# Patient Record
Sex: MALE
[De-identification: names, ages, dates, MRNs, and addresses within clinical notes are randomized per-mention and may not be internally consistent; named-entity substitution may affect disease eponyms.]

---

## 2023-06-07 PROBLEM — Z00.00 ENCOUNTER FOR PREVENTIVE HEALTH EXAMINATION: Status: ACTIVE | Noted: 2023-06-07

## 2023-07-07 ENCOUNTER — APPOINTMENT (OUTPATIENT)
Dept: DERMATOLOGY | Facility: CLINIC | Age: 58
End: 2023-07-07
Payer: COMMERCIAL

## 2023-07-07 VITALS — WEIGHT: 225 LBS | BODY MASS INDEX: 30.48 KG/M2 | HEIGHT: 72 IN

## 2023-07-07 DIAGNOSIS — D48.5 NEOPLASM OF UNCERTAIN BEHAVIOR OF SKIN: ICD-10-CM

## 2023-07-07 DIAGNOSIS — D22.9 MELANOCYTIC NEVI, UNSPECIFIED: ICD-10-CM

## 2023-07-07 PROCEDURE — 11300 SHAVE SKIN LESION 0.5 CM/<: CPT

## 2023-07-07 PROCEDURE — 99203 OFFICE O/P NEW LOW 30 MIN: CPT | Mod: 25

## 2023-07-07 RX ORDER — MUPIROCIN 20 MG/G
2 OINTMENT TOPICAL
Qty: 1 | Refills: 0 | Status: ACTIVE | COMMUNITY
Start: 2023-07-07 | End: 1900-01-01

## 2023-07-14 ENCOUNTER — NON-APPOINTMENT (OUTPATIENT)
Age: 58
End: 2023-07-14

## 2023-07-14 LAB — DERMATOLOGY BIOPSY: NORMAL

## 2024-05-08 ENCOUNTER — APPOINTMENT (OUTPATIENT)
Dept: DERMATOLOGY | Facility: CLINIC | Age: 59
End: 2024-05-08
Payer: COMMERCIAL

## 2024-05-08 DIAGNOSIS — R22.9 LOCALIZED SWELLING, MASS AND LUMP, UNSPECIFIED: ICD-10-CM

## 2024-05-08 DIAGNOSIS — L82.1 OTHER SEBORRHEIC KERATOSIS: ICD-10-CM

## 2024-05-08 DIAGNOSIS — R52 PAIN, UNSPECIFIED: ICD-10-CM

## 2024-05-08 DIAGNOSIS — L81.0 POSTINFLAMMATORY HYPERPIGMENTATION: ICD-10-CM

## 2024-05-08 DIAGNOSIS — L73.1 PSEUDOFOLLICULITIS BARBAE: ICD-10-CM

## 2024-05-08 PROCEDURE — 99214 OFFICE O/P EST MOD 30 MIN: CPT

## 2024-05-08 RX ORDER — CLINDAMYCIN PHOSPHATE AND BENZOYL PEROXIDE 10; 50 MG/G; MG/G
1.2-5 GEL TOPICAL TWICE DAILY
Qty: 1 | Refills: 0 | Status: ACTIVE | COMMUNITY
Start: 2024-05-08 | End: 1900-01-01

## 2024-05-08 NOTE — ASSESSMENT
[FreeTextEntry1] : #Pain with an overlying #hyperpigmented patch on the R upper arm  New diagnosis with uncertain prognosis  - Suspect resolved inflammatory lesion with PIH, as well as underlying MSK issue  - F/u with primary care for further imaging and workup   #Ingrown hair, posterior neck - new diagnosis with uncertain prognosis  - Diagnosis, chronic nature, disease course, treatment options and goals of therapy discussed - START clinda-BPO 1.2-5% gel to AA BID as needed  - May inject with ILK if becomes inflamed; pt defers today  #Seborrheic Keratosis - These growths are benign - Related to genetics - these lesions run in families; NOT related to sun exposure - No treatment warranted unless inflamed   RTC October for FBSE

## 2024-05-08 NOTE — HISTORY OF PRESENT ILLNESS
[FreeTextEntry1] : spots [de-identified] : WHITNEY MCKEE is a 58 year old M who present for f/u as below.   #Bump on right upper arm since February. Painful. Taking an anti-inflammatory medication (unsure of name) without much relief. Denying shoulder or neck pain.  #Scaly bump on L back #Bump on back of neck, irritated by a chain for years. Painful and drains with flares.    Derm hx: FEP on R flank Personal hx of skin cancer: no  FHx of skin cancer: no  Social hx:  for electrical company

## 2024-05-08 NOTE — PHYSICAL EXAM
[Alert] : alert [Oriented x 3] : ~L oriented x 3 [Well Nourished] : well nourished [Conjunctiva Non-injected] : conjunctiva non-injected [No Visual Lymphadenopathy] : no visual  lymphadenopathy [No Clubbing] : no clubbing [No Edema] : no edema [No Bromhidrosis] : no bromhidrosis [No Chromhidrosis] : no chromhidrosis [Declined] : declined [FreeTextEntry3] : stuck-on brown plaque on the left lower back   light pink patch with overlying pustule on the occipital scalp  faint hyperpigmented patch on the right upper arm; no palpable SQ lesion

## 2024-05-09 ENCOUNTER — APPOINTMENT (OUTPATIENT)
Dept: ORTHOPEDIC SURGERY | Facility: CLINIC | Age: 59
End: 2024-05-09
Payer: COMMERCIAL

## 2024-05-09 VITALS
WEIGHT: 212 LBS | SYSTOLIC BLOOD PRESSURE: 108 MMHG | DIASTOLIC BLOOD PRESSURE: 75 MMHG | HEIGHT: 72 IN | TEMPERATURE: 97.6 F | HEART RATE: 82 BPM | BODY MASS INDEX: 28.71 KG/M2

## 2024-05-09 DIAGNOSIS — M75.81 OTHER SHOULDER LESIONS, RIGHT SHOULDER: ICD-10-CM

## 2024-05-09 PROCEDURE — 99204 OFFICE O/P NEW MOD 45 MIN: CPT

## 2024-05-09 NOTE — PHYSICAL EXAM
[de-identified] : Constitutional: Well-nourished, well-developed, No acute distress Respiratory:  Good respiratory effort, no SOB Psychiatric: Pleasant and normal affect, alert and oriented x3 Skin: Clean dry and intact B/L UE Musculoskeletal: normal except where as noted in regional exam   Right Shoulder: APPEARANCE: no marked deformities, no swelling or malalignment POSITIVE TENDERNESS: supraspinatus NONTENDER:  long head biceps tendon, infraspinatus, teres minor. biceps. anterior and posterior capsule. AC joint. ROM: full with mild painful arc past 60 degrees, no scapular winging or dyskinesia present RESISTIVE TESTING: MMT 4+/5 ER, Flexion and Empty can, 5/5 IR. painless 5/5 resisted ext, horizontal abd/add SPECIAL TESTS: + Horn and Neers, mildly + cross arm adduction, + Speeds, neg Sams's, neg Drop Arm, neg Apprehension. neg apley's scratch test    [de-identified] :  The following radiographs were ordered and read by me during this patient's visit. I reviewed each radiograph in detail with the patient and discussed the findings as highlighted below.   3 views of the right shoulder were obtained today that show no fracture, or dislocation. There are no degenerative changes seen. There is no malalignment. No obvious osseous abnormality. Otherwise unremarkable.

## 2024-05-09 NOTE — HISTORY OF PRESENT ILLNESS
[de-identified] : 58 year old RHD male presents today with right arm pain since February 2024. No injury reported. The pain is constant worse when staying stationary for a while. Pain is relieved by lifting  arm overhead while laying down or compressing the area. Localizes pain to the right triceps. Denies numbness or tingling. Patient works as a .

## 2024-05-09 NOTE — DISCUSSION/SUMMARY
[de-identified] : Discussed findings of today's exam and possible causes of patient's pain.  Educated patient on their most probable diagnosis of chronic intermittent right shoulder pain with recent atraumatic exacerbation due to subacromial impingement.  Reviewed possible courses of treatment, and we collaboratively decided best course of treatment at this time will include conservative management.  Patient has no evidence of any high-grade rotator cuff tear or rupture, no surgical indications, he has atraumatic onset of shoulder and upper arm pain, no need for MRI at this time.  Patient is advised that the pain in his tricep area is likely referred pain from subacromial impingement.  Patient will be started on a course of physical therapy to restore normal range of motion and strength as tolerated.  Patient was started on meloxicam by his PCP, recommend taking this medication once daily consistently for 1-2 weeks, then as needed thereafter.  Patient states understanding of this prescription drug management plan.  We discussed appropriate role and timing of a cortisone injection, patient elected to defer at this time, may consider if not improving.  Follow up as needed.  Patient appreciates and agrees with current plan.  This note was generated using dragon medical dictation software.  A reasonable effort has been made for proofreading its contents, but typos may still remain.  If there are any questions or points of clarification needed please notify my office.

## 2024-10-09 ENCOUNTER — APPOINTMENT (OUTPATIENT)
Dept: DERMATOLOGY | Facility: CLINIC | Age: 59
End: 2024-10-09
Payer: COMMERCIAL

## 2024-10-09 DIAGNOSIS — L82.1 OTHER SEBORRHEIC KERATOSIS: ICD-10-CM

## 2024-10-09 DIAGNOSIS — L70.8 OTHER ACNE: ICD-10-CM

## 2024-10-09 DIAGNOSIS — D22.9 MELANOCYTIC NEVI, UNSPECIFIED: ICD-10-CM

## 2024-10-09 DIAGNOSIS — Z12.83 ENCOUNTER FOR SCREENING FOR MALIGNANT NEOPLASM OF SKIN: ICD-10-CM

## 2024-10-09 PROCEDURE — 99214 OFFICE O/P EST MOD 30 MIN: CPT
